# Patient Record
Sex: FEMALE | Race: WHITE | NOT HISPANIC OR LATINO | URBAN - METROPOLITAN AREA
[De-identification: names, ages, dates, MRNs, and addresses within clinical notes are randomized per-mention and may not be internally consistent; named-entity substitution may affect disease eponyms.]

---

## 2017-03-31 ENCOUNTER — OFFICE VISIT - HEALTHEAST (OUTPATIENT)
Dept: GERIATRICS | Facility: CLINIC | Age: 82
End: 2017-03-31

## 2017-03-31 DIAGNOSIS — J44.9 COPD (CHRONIC OBSTRUCTIVE PULMONARY DISEASE) (H): ICD-10-CM

## 2017-03-31 DIAGNOSIS — R41.89 COGNITIVE IMPAIRMENT: ICD-10-CM

## 2017-03-31 DIAGNOSIS — D64.9 ANEMIA: ICD-10-CM

## 2017-03-31 DIAGNOSIS — F32.A DEPRESSION: ICD-10-CM

## 2017-03-31 DIAGNOSIS — I10 ESSENTIAL HYPERTENSION: ICD-10-CM

## 2017-03-31 DIAGNOSIS — J18.9 PNEUMONIA OF BOTH LUNGS DUE TO INFECTIOUS ORGANISM, UNSPECIFIED PART OF LUNG: ICD-10-CM

## 2017-04-04 ENCOUNTER — OFFICE VISIT - HEALTHEAST (OUTPATIENT)
Dept: GERIATRICS | Facility: CLINIC | Age: 82
End: 2017-04-04

## 2017-04-04 DIAGNOSIS — R41.89 COGNITIVE IMPAIRMENT: ICD-10-CM

## 2017-04-04 DIAGNOSIS — D64.9 ANEMIA: ICD-10-CM

## 2017-04-04 DIAGNOSIS — J44.9 COPD (CHRONIC OBSTRUCTIVE PULMONARY DISEASE) (H): ICD-10-CM

## 2017-04-04 DIAGNOSIS — F32.A DEPRESSION: ICD-10-CM

## 2017-04-04 DIAGNOSIS — J18.9 PNEUMONIA OF BOTH LUNGS DUE TO INFECTIOUS ORGANISM, UNSPECIFIED PART OF LUNG: ICD-10-CM

## 2017-04-04 DIAGNOSIS — I25.10 CORONARY ARTERY DISEASE: ICD-10-CM

## 2017-04-04 DIAGNOSIS — I10 ESSENTIAL HYPERTENSION WITH GOAL BLOOD PRESSURE LESS THAN 130/85: ICD-10-CM

## 2017-04-07 ENCOUNTER — OFFICE VISIT - HEALTHEAST (OUTPATIENT)
Dept: GERIATRICS | Facility: CLINIC | Age: 82
End: 2017-04-07

## 2017-04-07 DIAGNOSIS — J44.9 COPD (CHRONIC OBSTRUCTIVE PULMONARY DISEASE) (H): ICD-10-CM

## 2017-04-07 DIAGNOSIS — F32.A DEPRESSION: ICD-10-CM

## 2017-04-07 DIAGNOSIS — D64.9 ANEMIA: ICD-10-CM

## 2017-04-07 DIAGNOSIS — J18.9 PNEUMONIA OF BOTH LUNGS DUE TO INFECTIOUS ORGANISM, UNSPECIFIED PART OF LUNG: ICD-10-CM

## 2017-04-07 DIAGNOSIS — I10 ESSENTIAL HYPERTENSION WITH GOAL BLOOD PRESSURE LESS THAN 130/85: ICD-10-CM

## 2017-04-07 DIAGNOSIS — R41.89 COGNITIVE IMPAIRMENT: ICD-10-CM

## 2017-04-10 ENCOUNTER — AMBULATORY - HEALTHEAST (OUTPATIENT)
Dept: GERIATRICS | Facility: CLINIC | Age: 82
End: 2017-04-10

## 2017-04-10 RX ORDER — ASCORBIC ACID 500 MG
500 TABLET ORAL 2 TIMES DAILY
Status: SHIPPED | COMMUNITY
Start: 2017-04-10

## 2017-04-10 RX ORDER — FERROUS SULFATE 325(65) MG
1 TABLET ORAL 2 TIMES DAILY
Status: SHIPPED | COMMUNITY
Start: 2017-04-10

## 2017-04-11 ENCOUNTER — OFFICE VISIT - HEALTHEAST (OUTPATIENT)
Dept: GERIATRICS | Facility: CLINIC | Age: 82
End: 2017-04-11

## 2017-04-11 DIAGNOSIS — J44.9 COPD (CHRONIC OBSTRUCTIVE PULMONARY DISEASE) (H): ICD-10-CM

## 2017-04-11 DIAGNOSIS — F32.A DEPRESSION: ICD-10-CM

## 2017-04-11 DIAGNOSIS — I10 ESSENTIAL HYPERTENSION WITH GOAL BLOOD PRESSURE LESS THAN 130/85: ICD-10-CM

## 2017-04-11 DIAGNOSIS — D64.9 ANEMIA: ICD-10-CM

## 2017-04-11 DIAGNOSIS — J18.9 PNEUMONIA OF BOTH LUNGS DUE TO INFECTIOUS ORGANISM, UNSPECIFIED PART OF LUNG: ICD-10-CM

## 2017-04-11 DIAGNOSIS — R41.89 COGNITIVE IMPAIRMENT: ICD-10-CM

## 2017-04-14 ENCOUNTER — OFFICE VISIT - HEALTHEAST (OUTPATIENT)
Dept: GERIATRICS | Facility: CLINIC | Age: 82
End: 2017-04-14

## 2017-04-14 DIAGNOSIS — F32.A DEPRESSION: ICD-10-CM

## 2017-04-14 DIAGNOSIS — I10 ESSENTIAL HYPERTENSION WITH GOAL BLOOD PRESSURE LESS THAN 130/85: ICD-10-CM

## 2017-04-14 DIAGNOSIS — A60.00 GENITAL HERPES: ICD-10-CM

## 2017-04-14 DIAGNOSIS — E78.5 HYPERLIPIDEMIA: ICD-10-CM

## 2017-04-14 DIAGNOSIS — R41.89 COGNITIVE IMPAIRMENT: ICD-10-CM

## 2017-04-14 DIAGNOSIS — D64.9 ANEMIA: ICD-10-CM

## 2017-04-18 ENCOUNTER — OFFICE VISIT - HEALTHEAST (OUTPATIENT)
Dept: GERIATRICS | Facility: CLINIC | Age: 82
End: 2017-04-18

## 2017-04-18 DIAGNOSIS — R41.89 COGNITIVE IMPAIRMENT: ICD-10-CM

## 2017-04-18 DIAGNOSIS — J44.9 COPD (CHRONIC OBSTRUCTIVE PULMONARY DISEASE) (H): ICD-10-CM

## 2017-04-18 DIAGNOSIS — J18.9 PNEUMONIA OF BOTH LUNGS DUE TO INFECTIOUS ORGANISM, UNSPECIFIED PART OF LUNG: ICD-10-CM

## 2017-04-18 DIAGNOSIS — F32.A DEPRESSION: ICD-10-CM

## 2017-04-18 DIAGNOSIS — I10 ESSENTIAL HYPERTENSION WITH GOAL BLOOD PRESSURE LESS THAN 130/85: ICD-10-CM

## 2017-04-18 DIAGNOSIS — D64.9 ANEMIA: ICD-10-CM

## 2017-04-21 ENCOUNTER — AMBULATORY - HEALTHEAST (OUTPATIENT)
Dept: GERIATRICS | Facility: CLINIC | Age: 82
End: 2017-04-21

## 2018-01-17 ENCOUNTER — RECORDS - HEALTHEAST (OUTPATIENT)
Dept: LAB | Facility: CLINIC | Age: 83
End: 2018-01-17

## 2018-01-17 LAB
ERYTHROCYTE [DISTWIDTH] IN BLOOD BY AUTOMATED COUNT: 14.9 % (ref 11–14.5)
HCT VFR BLD AUTO: 42.1 % (ref 35–47)
HGB BLD-MCNC: 12.7 G/DL (ref 12–16)
MCH RBC QN AUTO: 29.5 PG (ref 27–34)
MCHC RBC AUTO-ENTMCNC: 30.2 G/DL (ref 32–36)
MCV RBC AUTO: 98 FL (ref 80–100)
PLATELET # BLD AUTO: 260 THOU/UL (ref 140–440)
PMV BLD AUTO: 10.3 FL (ref 8.5–12.5)
RBC # BLD AUTO: 4.31 MILL/UL (ref 3.8–5.4)
WBC: 7.5 THOU/UL (ref 4–11)

## 2018-01-18 LAB
25(OH)D3 SERPL-MCNC: 34 NG/ML (ref 30–80)
ANION GAP SERPL CALCULATED.3IONS-SCNC: 7 MMOL/L (ref 5–18)
BUN SERPL-MCNC: 29 MG/DL (ref 8–28)
CALCIUM SERPL-MCNC: 10.3 MG/DL (ref 8.5–10.5)
CHLORIDE BLD-SCNC: 107 MMOL/L (ref 98–107)
CO2 SERPL-SCNC: 27 MMOL/L (ref 22–31)
CREAT SERPL-MCNC: 0.94 MG/DL (ref 0.6–1.1)
GFR SERPL CREATININE-BSD FRML MDRD: 57 ML/MIN/1.73M2
GLUCOSE BLD-MCNC: 79 MG/DL (ref 70–125)
POTASSIUM BLD-SCNC: 4.3 MMOL/L (ref 3.5–5)
SODIUM SERPL-SCNC: 141 MMOL/L (ref 136–145)

## 2018-07-19 ENCOUNTER — RECORDS - HEALTHEAST (OUTPATIENT)
Dept: LAB | Facility: CLINIC | Age: 83
End: 2018-07-19

## 2018-07-19 LAB
ALBUMIN SERPL-MCNC: 3.2 G/DL (ref 3.5–5)
ALP SERPL-CCNC: 88 U/L (ref 45–120)
ALT SERPL W P-5'-P-CCNC: 16 U/L (ref 0–45)
ANION GAP SERPL CALCULATED.3IONS-SCNC: 6 MMOL/L (ref 5–18)
AST SERPL W P-5'-P-CCNC: 16 U/L (ref 0–40)
BILIRUB SERPL-MCNC: 0.3 MG/DL (ref 0–1)
BUN SERPL-MCNC: 25 MG/DL (ref 8–28)
CALCIUM SERPL-MCNC: 10 MG/DL (ref 8.5–10.5)
CHLORIDE BLD-SCNC: 110 MMOL/L (ref 98–107)
CO2 SERPL-SCNC: 25 MMOL/L (ref 22–31)
CREAT SERPL-MCNC: 1.01 MG/DL (ref 0.6–1.1)
ERYTHROCYTE [DISTWIDTH] IN BLOOD BY AUTOMATED COUNT: 13.8 % (ref 11–14.5)
GFR SERPL CREATININE-BSD FRML MDRD: 52 ML/MIN/1.73M2
GLUCOSE BLD-MCNC: 133 MG/DL (ref 70–125)
HCT VFR BLD AUTO: 40.9 % (ref 35–47)
HGB BLD-MCNC: 12.7 G/DL (ref 12–16)
MCH RBC QN AUTO: 30.3 PG (ref 27–34)
MCHC RBC AUTO-ENTMCNC: 31.1 G/DL (ref 32–36)
MCV RBC AUTO: 98 FL (ref 80–100)
PLATELET # BLD AUTO: 194 THOU/UL (ref 140–440)
PMV BLD AUTO: 10.1 FL (ref 8.5–12.5)
POTASSIUM BLD-SCNC: 4.5 MMOL/L (ref 3.5–5)
PROT SERPL-MCNC: 5.7 G/DL (ref 6–8)
RBC # BLD AUTO: 4.19 MILL/UL (ref 3.8–5.4)
SODIUM SERPL-SCNC: 141 MMOL/L (ref 136–145)
VIT B12 SERPL-MCNC: 441 PG/ML (ref 213–816)
WBC: 5.5 THOU/UL (ref 4–11)

## 2019-02-14 ENCOUNTER — RECORDS - HEALTHEAST (OUTPATIENT)
Dept: LAB | Facility: CLINIC | Age: 84
End: 2019-02-14

## 2019-02-14 LAB
ANION GAP SERPL CALCULATED.3IONS-SCNC: 10 MMOL/L (ref 5–18)
BUN SERPL-MCNC: 28 MG/DL (ref 8–28)
CALCIUM SERPL-MCNC: 10.7 MG/DL (ref 8.5–10.5)
CHLORIDE BLD-SCNC: 108 MMOL/L (ref 98–107)
CO2 SERPL-SCNC: 22 MMOL/L (ref 22–31)
CREAT SERPL-MCNC: 0.97 MG/DL (ref 0.6–1.1)
ERYTHROCYTE [DISTWIDTH] IN BLOOD BY AUTOMATED COUNT: 13.8 % (ref 11–14.5)
GFR SERPL CREATININE-BSD FRML MDRD: 54 ML/MIN/1.73M2
GLUCOSE BLD-MCNC: 93 MG/DL (ref 70–125)
HCT VFR BLD AUTO: 46.4 % (ref 35–47)
HGB BLD-MCNC: 14.1 G/DL (ref 12–16)
MCH RBC QN AUTO: 30.5 PG (ref 27–34)
MCHC RBC AUTO-ENTMCNC: 30.4 G/DL (ref 32–36)
MCV RBC AUTO: 100 FL (ref 80–100)
PLATELET # BLD AUTO: 248 THOU/UL (ref 140–440)
PMV BLD AUTO: 10 FL (ref 8.5–12.5)
POTASSIUM BLD-SCNC: 4.5 MMOL/L (ref 3.5–5)
RBC # BLD AUTO: 4.63 MILL/UL (ref 3.8–5.4)
SODIUM SERPL-SCNC: 140 MMOL/L (ref 136–145)
WBC: 7.6 THOU/UL (ref 4–11)

## 2019-08-07 ENCOUNTER — RECORDS - HEALTHEAST (OUTPATIENT)
Dept: LAB | Facility: CLINIC | Age: 84
End: 2019-08-07

## 2019-08-07 LAB
ALBUMIN SERPL-MCNC: 3.7 G/DL (ref 3.5–5)
ALP SERPL-CCNC: 68 U/L (ref 45–120)
ALT SERPL W P-5'-P-CCNC: 10 U/L (ref 0–45)
ANION GAP SERPL CALCULATED.3IONS-SCNC: 5 MMOL/L (ref 5–18)
AST SERPL W P-5'-P-CCNC: 15 U/L (ref 0–40)
BILIRUB SERPL-MCNC: 0.3 MG/DL (ref 0–1)
BUN SERPL-MCNC: 25 MG/DL (ref 8–28)
CALCIUM SERPL-MCNC: 10.3 MG/DL (ref 8.5–10.5)
CHLORIDE BLD-SCNC: 110 MMOL/L (ref 98–107)
CO2 SERPL-SCNC: 26 MMOL/L (ref 22–31)
CREAT SERPL-MCNC: 0.92 MG/DL (ref 0.6–1.1)
ERYTHROCYTE [DISTWIDTH] IN BLOOD BY AUTOMATED COUNT: 13.7 % (ref 11–14.5)
GFR SERPL CREATININE-BSD FRML MDRD: 58 ML/MIN/1.73M2
GLUCOSE BLD-MCNC: 95 MG/DL (ref 70–125)
HBA1C MFR BLD: 5.4 % (ref 4.2–6.1)
HCT VFR BLD AUTO: 42.2 % (ref 35–47)
HGB BLD-MCNC: 13 G/DL (ref 12–16)
MCH RBC QN AUTO: 30.5 PG (ref 27–34)
MCHC RBC AUTO-ENTMCNC: 30.8 G/DL (ref 32–36)
MCV RBC AUTO: 99 FL (ref 80–100)
PLATELET # BLD AUTO: 191 THOU/UL (ref 140–440)
PMV BLD AUTO: 10.1 FL (ref 8.5–12.5)
POTASSIUM BLD-SCNC: 4.6 MMOL/L (ref 3.5–5)
PROT SERPL-MCNC: 6.3 G/DL (ref 6–8)
RBC # BLD AUTO: 4.26 MILL/UL (ref 3.8–5.4)
SODIUM SERPL-SCNC: 141 MMOL/L (ref 136–145)
TSH SERPL DL<=0.005 MIU/L-ACNC: 1.86 UIU/ML (ref 0.3–5)
VIT B12 SERPL-MCNC: 366 PG/ML (ref 213–816)
WBC: 8.2 THOU/UL (ref 4–11)

## 2019-10-28 ENCOUNTER — RECORDS - HEALTHEAST (OUTPATIENT)
Dept: LAB | Facility: CLINIC | Age: 84
End: 2019-10-28

## 2019-10-28 LAB
ALBUMIN UR-MCNC: ABNORMAL MG/DL
APPEARANCE UR: ABNORMAL
BACTERIA #/AREA URNS HPF: ABNORMAL HPF
BILIRUB UR QL STRIP: NEGATIVE
COLOR UR AUTO: YELLOW
GLUCOSE UR STRIP-MCNC: NEGATIVE MG/DL
HGB UR QL STRIP: NEGATIVE
KETONES UR STRIP-MCNC: NEGATIVE MG/DL
LEUKOCYTE ESTERASE UR QL STRIP: ABNORMAL
MUCOUS THREADS #/AREA URNS LPF: ABNORMAL LPF
NITRATE UR QL: NEGATIVE
PH UR STRIP: 5.5 [PH] (ref 4.5–8)
RBC #/AREA URNS AUTO: ABNORMAL HPF
SP GR UR STRIP: 1.02 (ref 1–1.03)
SQUAMOUS #/AREA URNS AUTO: ABNORMAL LPF
UROBILINOGEN UR STRIP-ACNC: ABNORMAL
WBC #/AREA URNS AUTO: >100 HPF
WBC CLUMPS #/AREA URNS HPF: PRESENT /[HPF]

## 2019-10-30 LAB — BACTERIA SPEC CULT: ABNORMAL

## 2019-11-27 ENCOUNTER — RECORDS - HEALTHEAST (OUTPATIENT)
Dept: LAB | Facility: CLINIC | Age: 84
End: 2019-11-27

## 2019-11-27 LAB
ALBUMIN SERPL-MCNC: 2.6 G/DL (ref 3.5–5)
ALP SERPL-CCNC: 203 U/L (ref 45–120)
ALT SERPL W P-5'-P-CCNC: 12 U/L (ref 0–45)
ANION GAP SERPL CALCULATED.3IONS-SCNC: 5 MMOL/L (ref 5–18)
AST SERPL W P-5'-P-CCNC: 13 U/L (ref 0–40)
BILIRUB SERPL-MCNC: 0.3 MG/DL (ref 0–1)
BUN SERPL-MCNC: 24 MG/DL (ref 8–28)
CALCIUM SERPL-MCNC: 9.7 MG/DL (ref 8.5–10.5)
CHLORIDE BLD-SCNC: 108 MMOL/L (ref 98–107)
CO2 SERPL-SCNC: 27 MMOL/L (ref 22–31)
CREAT SERPL-MCNC: 1.1 MG/DL (ref 0.6–1.1)
ERYTHROCYTE [DISTWIDTH] IN BLOOD BY AUTOMATED COUNT: 13.4 % (ref 11–14.5)
GFR SERPL CREATININE-BSD FRML MDRD: 47 ML/MIN/1.73M2
GLUCOSE BLD-MCNC: 98 MG/DL (ref 70–125)
HCT VFR BLD AUTO: 31.3 % (ref 35–47)
HGB BLD-MCNC: 9.1 G/DL (ref 12–16)
MCH RBC QN AUTO: 29.4 PG (ref 27–34)
MCHC RBC AUTO-ENTMCNC: 29.1 G/DL (ref 32–36)
MCV RBC AUTO: 101 FL (ref 80–100)
PLATELET # BLD AUTO: 382 THOU/UL (ref 140–440)
PMV BLD AUTO: 9.4 FL (ref 8.5–12.5)
POTASSIUM BLD-SCNC: 4.5 MMOL/L (ref 3.5–5)
PROT SERPL-MCNC: 5.9 G/DL (ref 6–8)
RBC # BLD AUTO: 3.09 MILL/UL (ref 3.8–5.4)
SODIUM SERPL-SCNC: 140 MMOL/L (ref 136–145)
WBC: 10.1 THOU/UL (ref 4–11)

## 2020-01-08 ENCOUNTER — RECORDS - HEALTHEAST (OUTPATIENT)
Dept: LAB | Facility: CLINIC | Age: 85
End: 2020-01-08

## 2020-01-08 LAB
ALBUMIN SERPL-MCNC: 3.1 G/DL (ref 3.5–5)
ALP SERPL-CCNC: 100 U/L (ref 45–120)
ALT SERPL W P-5'-P-CCNC: 14 U/L (ref 0–45)
ANION GAP SERPL CALCULATED.3IONS-SCNC: 6 MMOL/L (ref 5–18)
AST SERPL W P-5'-P-CCNC: 22 U/L (ref 0–40)
BILIRUB SERPL-MCNC: 0.3 MG/DL (ref 0–1)
BUN SERPL-MCNC: 12 MG/DL (ref 8–28)
CALCIUM SERPL-MCNC: 10.3 MG/DL (ref 8.5–10.5)
CHLORIDE BLD-SCNC: 106 MMOL/L (ref 98–107)
CO2 SERPL-SCNC: 29 MMOL/L (ref 22–31)
CREAT SERPL-MCNC: 0.83 MG/DL (ref 0.6–1.1)
ERYTHROCYTE [DISTWIDTH] IN BLOOD BY AUTOMATED COUNT: 13.7 % (ref 11–14.5)
GFR SERPL CREATININE-BSD FRML MDRD: >60 ML/MIN/1.73M2
GLUCOSE BLD-MCNC: 84 MG/DL (ref 70–125)
HCT VFR BLD AUTO: 40.7 % (ref 35–47)
HGB BLD-MCNC: 11.8 G/DL (ref 12–16)
MCH RBC QN AUTO: 27.4 PG (ref 27–34)
MCHC RBC AUTO-ENTMCNC: 29 G/DL (ref 32–36)
MCV RBC AUTO: 95 FL (ref 80–100)
PLATELET # BLD AUTO: 196 THOU/UL (ref 140–440)
PMV BLD AUTO: 10.5 FL (ref 8.5–12.5)
POTASSIUM BLD-SCNC: 4.3 MMOL/L (ref 3.5–5)
PROT SERPL-MCNC: 6.1 G/DL (ref 6–8)
RBC # BLD AUTO: 4.3 MILL/UL (ref 3.8–5.4)
SODIUM SERPL-SCNC: 141 MMOL/L (ref 136–145)
WBC: 4.9 THOU/UL (ref 4–11)

## 2020-06-24 ENCOUNTER — RECORDS - HEALTHEAST (OUTPATIENT)
Dept: LAB | Facility: CLINIC | Age: 85
End: 2020-06-24

## 2020-06-24 LAB
ALBUMIN SERPL-MCNC: 3.3 G/DL (ref 3.5–5)
ALP SERPL-CCNC: 124 U/L (ref 45–120)
ALT SERPL W P-5'-P-CCNC: 11 U/L (ref 0–45)
ANION GAP SERPL CALCULATED.3IONS-SCNC: 8 MMOL/L (ref 5–18)
AST SERPL W P-5'-P-CCNC: 15 U/L (ref 0–40)
BILIRUB SERPL-MCNC: 0.3 MG/DL (ref 0–1)
BUN SERPL-MCNC: 24 MG/DL (ref 8–28)
CALCIUM SERPL-MCNC: 9.6 MG/DL (ref 8.5–10.5)
CHLORIDE BLD-SCNC: 107 MMOL/L (ref 98–107)
CO2 SERPL-SCNC: 26 MMOL/L (ref 22–31)
CREAT SERPL-MCNC: 1.09 MG/DL (ref 0.6–1.1)
ERYTHROCYTE [DISTWIDTH] IN BLOOD BY AUTOMATED COUNT: 15.9 % (ref 11–14.5)
GFR SERPL CREATININE-BSD FRML MDRD: 47 ML/MIN/1.73M2
GLUCOSE BLD-MCNC: 88 MG/DL (ref 70–125)
HCT VFR BLD AUTO: 43.2 % (ref 35–47)
HGB BLD-MCNC: 12.9 G/DL (ref 12–16)
MCH RBC QN AUTO: 26.4 PG (ref 27–34)
MCHC RBC AUTO-ENTMCNC: 29.9 G/DL (ref 32–36)
MCV RBC AUTO: 88 FL (ref 80–100)
PLATELET # BLD AUTO: 343 THOU/UL (ref 140–440)
PMV BLD AUTO: 10.1 FL (ref 8.5–12.5)
POTASSIUM BLD-SCNC: 4.6 MMOL/L (ref 3.5–5)
PROT SERPL-MCNC: 6.7 G/DL (ref 6–8)
RBC # BLD AUTO: 4.89 MILL/UL (ref 3.8–5.4)
SODIUM SERPL-SCNC: 141 MMOL/L (ref 136–145)
TSH SERPL DL<=0.005 MIU/L-ACNC: 3.26 UIU/ML (ref 0.3–5)
VIT B12 SERPL-MCNC: 292 PG/ML (ref 213–816)
WBC: 8.9 THOU/UL (ref 4–11)

## 2020-06-26 LAB — 25(OH)D3 SERPL-MCNC: 23 NG/ML (ref 30–80)

## 2020-09-17 ENCOUNTER — RECORDS - HEALTHEAST (OUTPATIENT)
Dept: LAB | Facility: CLINIC | Age: 85
End: 2020-09-17

## 2020-09-18 LAB — VALPROATE SERPL-MCNC: 39.2 UG/ML (ref 50–150)

## 2021-05-24 ENCOUNTER — RECORDS - HEALTHEAST (OUTPATIENT)
Dept: ADMINISTRATIVE | Facility: CLINIC | Age: 86
End: 2021-05-24

## 2021-05-25 ENCOUNTER — RECORDS - HEALTHEAST (OUTPATIENT)
Dept: ADMINISTRATIVE | Facility: CLINIC | Age: 86
End: 2021-05-25

## 2021-05-28 ENCOUNTER — RECORDS - HEALTHEAST (OUTPATIENT)
Dept: ADMINISTRATIVE | Facility: CLINIC | Age: 86
End: 2021-05-28

## 2021-05-30 ENCOUNTER — RECORDS - HEALTHEAST (OUTPATIENT)
Dept: ADMINISTRATIVE | Facility: CLINIC | Age: 86
End: 2021-05-30

## 2021-05-30 VITALS — BODY MASS INDEX: 24.92 KG/M2 | WEIGHT: 145.2 LBS

## 2021-05-30 VITALS — WEIGHT: 145.2 LBS | BODY MASS INDEX: 24.92 KG/M2

## 2021-05-31 ENCOUNTER — RECORDS - HEALTHEAST (OUTPATIENT)
Dept: ADMINISTRATIVE | Facility: CLINIC | Age: 86
End: 2021-05-31

## 2021-06-01 ENCOUNTER — RECORDS - HEALTHEAST (OUTPATIENT)
Dept: ADMINISTRATIVE | Facility: CLINIC | Age: 86
End: 2021-06-01

## 2021-06-02 ENCOUNTER — RECORDS - HEALTHEAST (OUTPATIENT)
Dept: ADMINISTRATIVE | Facility: CLINIC | Age: 86
End: 2021-06-02

## 2021-06-09 NOTE — PROGRESS NOTES
[facility]        Visit Type: Review Of Multiple Medical Conditions (Cognitive impairment, hypertension, anemia)    Code Status:  DNR  Facility:  [facility] [[id number]]          PCP: No primary care provider on file.       PHONE: None     FAX:None        ASSESSMENT/PLAN:  1. Essential hypertension with goal blood pressure less than 130/85     2. Pneumonia of both lungs due to infectious organism, unspecified part of lung     3. Anemia     4. Cognitive impairment     5. COPD (chronic obstructive pulmonary disease)     6. Depression     7. Coronary artery disease       1. Pneumonia of both lungs due to infectious organism, unspecified part of lung   no evidence of recurrence.  Fever has now resolved, completed treatment with levofloxacin.     2. Essential hypertension   blood pressure is stable.  Norvasc has been discontinued in the hospital due to low blood pressures.  Also had electrolyte imbalance in the hospital which has resolved.     3. Cognitive impairment   has global amnesia per neurology.     4. Depression   stable, will restart Celexa 20 mg daily, review of other medications that she was taking at home also done today.  We will observe for now.  I do not think that she will need her other medications from home including gabapentin    5. Anemia   hemoglobin is low at 8.5 with increase RDW at 19.0.  Will check CBC with differential count, check Hemoccult ×3, check iron studies.  No evidence of bleeding at this time.  She is on ranitidine, we will not restart Prilosec at this time since the patient has a history of osteoporosis which could be exacerbated by Prilosec use    6. COPD (chronic obstructive pulmonary disease)   continue inhaler, Advair          HISTORY OF PRESENT ILLNESS:   Natividad Cheung is a 85 y.o. female with a history of CAD, COPD with exacerbation, hypertension, osteoporosis, depression, IC hemorrhage who was admitted for bilateral  pneumonia and hypoxemia.  She was seen by pulmonary and continued on IV levofloxacin which was later changed to oral levofloxacin.  She was initially treated with BiPAP but was discontinued after hypoxia has resolved.  Her chest CT scan showed resolving pneumonia.  She also was noted to have generalized weakness and deconditioning and so she is being sent to the TCU for further rehab.  She was also noted to have a long history of dementia but had increased cognitive impairment which initially was thought to be secondary to ICU delirium but despite resolving pneumonia, her cognitive impairment did not improve much.  Review of her notes showed that she was diagnosed with global amnesia by her primary neurologist.    Currently, she states that she is feeling well and that she does not have anymore any cough or shortness of breath.  Today, her fever has resolved and her UA is negative.  I was asked by her family to review all her home medications to see if she still needs them.  I did resume her Celexa and Valtrex but will continue to hold gabapentin and Prilosec unless there are further indications for this.  She denies any dysuria, hematuria, suprapubic pain, abdominal pain, nausea or vomiting.  Her appetite is improving and she states that she does not sleep as well.  She notes that she wakes up in the middle of the night but this is normal for her even at home.  She does not have any dizziness or lightheadedness.    Other review of systems are negative.      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     Osteoporosis      RBBB      Past Surgical History:   Procedure Laterality Date     CATARACT EXTRACTION Bilateral       LUMBAR LAMINECTOMY       REDUCTION MAMMAPLASTY       SHOULDER SURGERY       TOTAL HIP ARTHROPLASTY         SOCIAL HISTORY:  Social History     Social History     Marital status:      Spouse name: N/A     Number of children: 1     Years of education: N/A     Occupational History     Nurse      Social History Main Topics     Smoking status: Former Smoker     Smokeless tobacco: Not on file     Alcohol use 8.4 oz/week     14 Standard drinks or equivalent per week      Comment: per nephew, no history of alcoholism     Drug use: No     Sexual activity: No     Other Topics Concern     Not on file     Social History Narrative       FAMILY HISTORY:  Family History   Problem Relation Age of Onset     Dementia Mother      Diabetes type II Mother      Coronary artery disease Father      Macular degeneration Brother        MEDICATIONS:  Current Outpatient Prescriptions on File Prior to Visit   Medication Sig     acetaminophen (TYLENOL) 500 MG tablet Take 1,000 mg by mouth 2 (two) times a day as needed for pain or fever.     aspirin 81 MG EC tablet Take 81 mg by mouth daily.     calcium, as carbonate, (TUMS) 200 mg calcium (500 mg) chewable tablet Chew 1 tablet 2 (two) times a day.     docusate sodium (COLACE) 100 MG capsule Take 100 mg by mouth 2 (two) times a day.     ferrous sulfate 325 (65 FE) MG tablet Take 1 tablet (325 mg total) by mouth daily with breakfast.     fluticasone-salmeterol (ADVAIR) 100-50 mcg/dose DISKUS Inhale 1 puff 2 (two) times a day.     melatonin 1 mg Tab tablet Take 5 mg by mouth bedtime.     ranitidine (ZANTAC) 150 MG tablet Take 150 mg by mouth bedtime.     simvastatin (ZOCOR) 10 MG tablet Take 10 mg by mouth bedtime.     No current facility-administered medications on file prior to visit.        ALLERGIES:  No Known Allergies      PHYSICAL EXAMINATION:  Vital signs 116/66, 96%, 93, 17, 97.6  General: Awake, Alert, oriented x3, not in any form of acute distress, answers questions appropriately,  follows simple commands, frail and pale  HEENT: Pink conjunctiva, anicteric sclerae, oral mucosa is moist  NECK: Supple, without any lymphadenopathy, thyromegaly or any masses  LUNG:  good chest expansion. There are no crackles but with generalized rhonchi, no wheezes, normal AP diameter  BACK: No kyphosis of the thoracic spine  CVS: There is good S1  S2, there are no murmurs, no heaves, rhythm is regular  ABDOMEN: Globular and soft, nontender to palpation, no organomegaly, good bowel sounds  EXTREMITIES: Good range of motion on both upper and lower extremities, no pedal edema, no cyanosis or clubbing, no calf tenderness  SKIN: Warm and dry, no rashes or erythema noted    LABS:  Lab Results   Component Value Date    WBC 5.6 04/01/2017    HGB 8.5 (L) 04/04/2017    HCT 30.5 (L) 04/01/2017    MCV 82 04/01/2017     04/01/2017     Lab Results   Component Value Date    CREATININE 0.87 03/20/2017    BUN 24 03/20/2017     (L) 03/20/2017    K 3.9 03/20/2017     03/20/2017    CO2 25 03/20/2017           35 minutes of total time spent, greater than 55% of the time spent in coordination of care and counseling regarding the above medical issues and plan of care. I have reviewed the patient's medical records, labs and medications.       Electronically signed by:Kristine Mcmullen MD

## 2021-06-09 NOTE — PROGRESS NOTES
[facility]        Visit Type: H & P (Pneumonia, hypertension, anemia)    Code Status:  DNR  Facility:  [facility] [[id number]]          PCP: No primary care provider on file.       PHONE: None     FAX:None        ASSESSMENT/PLAN:  1. Pneumonia of both lungs due to infectious organism, unspecified part of lung   no evidence of recurrence.  But since with mild fever, will look for other source and check a UA UC.  If negative, will recheck chest x-ray.  Check hemogram    2. Essential hypertension   blood pressure is stable.  Norvasc has been discontinued in the hospital due to low blood pressures.  Also had electrolyte imbalance in the hospital which has resolved.     3. Cognitive impairment   has global amnesia per neurology.     4. Depression   stable, currently not on any medications    5. Anemia   check hemogram, continue ferrous sulfate    6. COPD (chronic obstructive pulmonary disease)   continue inhaler, Advair          HISTORY OF PRESENT ILLNESS:   Natividad Cheung is a 85 y.o. female with a history of CAD, COPD with exacerbation, hypertension, osteoporosis, depression, IC hemorrhage who was admitted for bilateral pneumonia and hypoxemia.  She was seen by pulmonary and continued on IV levofloxacin which was later changed to oral levofloxacin.  She was initially treated with BiPAP but was discontinued after hypoxia has resolved.  Her chest CT scan showed resolving pneumonia.  She also was noted to have generalized weakness and deconditioning and so she is being sent to the TCU for further rehab.  She was also noted to have a long history of dementia but had increased cognitive impairment which initially was thought to be secondary to ICU delirium but despite resolving pneumonia, her cognitive impairment did not improve much.  Review of her notes showed that she was diagnosed with global amnesia by her primary neurologist.    Currently, she states that  she is feeling well and that she does not have anymore any cough or shortness of breath.  Today she was noted to have a fever of 99.7.  She denies any dysuria, hematuria, suprapubic pain, abdominal pain, nausea or vomiting.  Her appetite is improving and she states that she does not sleep as well.  She does not have any dizziness or lightheadedness.    Other review of systems are negative.      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     Osteoporosis      RBBB      Past Surgical History:   Procedure Laterality Date     CATARACT EXTRACTION Bilateral      LUMBAR LAMINECTOMY       REDUCTION MAMMAPLASTY       SHOULDER SURGERY       TOTAL HIP ARTHROPLASTY         SOCIAL HISTORY:  Social History     Social History     Marital status:      Spouse name: N/A     Number of children: 1     Years of education: N/A     Occupational History     Nurse      Social History Main Topics     Smoking status: Former Smoker     Smokeless tobacco: Not on file     Alcohol use 8.4 oz/week     14 Standard drinks or equivalent per week      Comment: per nephew, no history of alcoholism     Drug use: No     Sexual activity: No     Other Topics Concern     Not on file     Social History Narrative       FAMILY HISTORY:  Family History   Problem Relation Age of Onset     Dementia Mother      Diabetes type II Mother      Coronary artery disease Father      Macular degeneration Brother        MEDICATIONS:  Current Outpatient Prescriptions on File Prior to Visit   Medication Sig     acetaminophen (TYLENOL) 500 MG tablet Take 1,000 mg by mouth 2 (two) times a day as needed for pain or fever.     aspirin 81 MG EC tablet Take 81 mg by mouth daily.      calcium, as carbonate, (TUMS) 200 mg calcium (500 mg) chewable tablet Chew 1 tablet 2 (two) times a day.     docusate sodium (COLACE) 100 MG capsule Take 100 mg by mouth 2 (two) times a day.     ferrous sulfate 325 (65 FE) MG tablet Take 1 tablet (325 mg total) by mouth daily with breakfast.     fluticasone-salmeterol (ADVAIR) 100-50 mcg/dose DISKUS Inhale 1 puff 2 (two) times a day.     melatonin 1 mg Tab tablet Take 5 mg by mouth bedtime.     ranitidine (ZANTAC) 150 MG tablet Take 150 mg by mouth bedtime.     simvastatin (ZOCOR) 10 MG tablet Take 10 mg by mouth bedtime.     No current facility-administered medications on file prior to visit.        ALLERGIES:  No Known Allergies      PHYSICAL EXAMINATION:  Vital signs 113/61, 93%, 98, 20, 99.7  General: Awake, Alert, oriented x3, not in any form of acute distress, answers questions appropriately, follows simple commands, frail and pale  HEENT: Pink conjunctiva, anicteric sclerae, oral mucosa is moist  NECK: Supple, without any lymphadenopathy, thyromegaly or any masses  LUNG:  good chest expansion. There are no crackles but with generalized rhonchi, no wheezes, normal AP diameter  BACK: No kyphosis of the thoracic spine  CVS: There is good S1  S2, there are no murmurs, no heaves, rhythm is regular  ABDOMEN: Globular and soft, nontender to palpation, no organomegaly, good bowel sounds  EXTREMITIES: Good range of motion on both upper and lower extremities, no pedal edema, no cyanosis or clubbing, no calf tenderness  SKIN: Warm and dry, no rashes or erythema noted    LABS:  Lab Results   Component Value Date    WBC 5.6 04/01/2017    HGB 8.7 (L) 04/01/2017    HCT 30.5 (L) 04/01/2017    MCV 82 04/01/2017     04/01/2017     Lab Results   Component Value Date    CREATININE 0.87 03/20/2017    BUN 24 03/20/2017     (L) 03/20/2017    K 3.9 03/20/2017     03/20/2017    CO2 25 03/20/2017           45 minutes of total time spent, greater than 55% of the  time spent in coordination of care and counseling regarding the above medical issues and plan of care. I have reviewed the patient's medical records, labs and medications.       Electronically signed by:Kristine Mcmullen MD

## 2021-06-09 NOTE — PROGRESS NOTES
[facility]    DATE:2017    NAME:  Natividad Cheung             :  1932  MRN: [id number]  CODE STATUS:  DNR    FACILITY:  [facility] [[id number]]       ROOM:   H202    CHIEF COMPLAINT/REASON FOR VISIT:  Chief Complaint   Patient presents with     Problem Visit     Acute respiratory failure with hypoxia     HISTORY OF PRESENT ILLNESS: Natividad Cheung is a 85 y.o. female with CAD, COPD with exacerbation, Hypertension, Osteoporosis, Depression, IC hemorrhage who was admitted for bilateral pneumonia and hypoxemia.  She was seen by pulmonary and continued on IV Levofloxacin which was later changed to oral Levofloxacin.  She was initially treated with BiPAP but was discontinued after hypoxia resolved.  Her chest CT scan showed resolving Pneumonia.   She has a long standing history of Dementia but had increased cognitive impairment which initially was thought to be secondary to ICU delirium but despite resolving Pneumonia, her cognitive impairment did not improve much.  She was diagnosed with global amnesia by her primary neurologist.She also was noted to have generalized weakness and deconditioning and so she is being sent to the TCU for further rehab.     Today, she states that she is feeling well and denies any respiratory symptomology. She has Anemia and her last Hgb was 8.5.  She is currently on Ferrous sulfate 325 mg daily.  She denies any fatigue and reports her weakness is improving.  She is Celexa for Depression.  Her mood is stable today.   She denies any pain and questions why staff is always asking her if she has pain.   She denies any dysuria, hematuria, suprapubic pain, abdominal pain, nausea or vomiting and her UA was negative.  Her appetite is good.  She does not have any dizziness or lightheadedness.    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     Osteoporosis      RBBB      Past Surgical History:   Procedure Laterality Date     CATARACT EXTRACTION Bilateral      LUMBAR LAMINECTOMY       REDUCTION MAMMAPLASTY       SHOULDER SURGERY       TOTAL HIP ARTHROPLASTY       Family History   Problem Relation Age of Onset     Dementia Mother      Diabetes type II Mother      Coronary artery disease Father      Macular degeneration Brother      Social History     Social History     Marital status:      Spouse name: N/A     Number of children: 1     Years of education: N/A     Occupational History     Nurse      Social History Main Topics     Smoking status: Former Smoker     Smokeless tobacco: Not on file     Alcohol use 8.4 oz/week     14 Standard drinks or equivalent per week      Comment: per nephew, no history of alcoholism     Drug use: No     Sexual activity: No     Other Topics Concern     Not on file     Social History Narrative     No Known Allergies     Current Outpatient Prescriptions   Medication Sig Dispense Refill     acetaminophen (TYLENOL) 500 MG tablet Take 1,000 mg by mouth 2 (two) times a day as needed for pain or fever.       ascorbic acid, vitamin C, (ASCORBIC ACID WITH LAXMI HIPS) 500 MG tablet Take 500 mg by mouth 2 (two) times a day.       aspirin 81 MG EC tablet Take 81 mg by mouth daily.       calcium, as carbonate, (TUMS) 200 mg calcium (500 mg) chewable tablet Chew 1 tablet 2 (two) times a day.       docusate sodium (COLACE) 100 MG capsule Take 100 mg by mouth 2 (two) times a day.       ferrous sulfate 325 (65 FE) MG tablet Take 1 tablet by mouth 2 (two) times a day. With food       fluticasone-salmeterol (ADVAIR) 100-50 mcg/dose DISKUS Inhale 1 puff 2 (two) times a day. 1 each 0     melatonin 1 mg Tab tablet Take 5 mg by mouth bedtime.       ranitidine (ZANTAC) 150 MG tablet Take  150 mg by mouth bedtime.       simvastatin (ZOCOR) 10 MG tablet Take 10 mg by mouth bedtime.       No current facility-administered medications for this visit.      REVIEW OF SYSTEMS    Currently, no fever, chills, or rigors. She does not have any visual or hearing problems. She denies any chest pain, headaches, palpitations, lightheadedness, dizziness, shortness of breath, or cough. Her appetite is good, he denies any GERD symptoms, she denies any difficulty with swallowing, nausea, or vomiting.  She denies any abdominal pain, diarrhea or constipation. She denies any urinary symptoms. No insomnia. No active bleeding. No rash.     PHYSICAL EXAMINATION:    Vitals:    04/11/17 2001   BP: 121/68   Pulse: 62   Resp: 16   Temp: 98.2  F (36.8  C)   SpO2: 96%   Weight: 145 lb 3.2 oz (65.9 kg)     General: Awake, Alert, oriented x3, not in any form of acute distress, answers questions appropriately, follows simple commands, conversant  HEENT: Pink conjunctiva, anicteric sclerae, oral mucosa is moist  NECK: Supple, without any lymphadenopathy, thyromegaly or any masses  LUNG: Clear to auscultation with good chest expansion. There are no crackles or wheezes, normal AP diameter  BACK: No kyphosis of the thoracic spine  CVS: There is good S1  S2, there are no murmurs or heaves, rhythm is regular  ABDOMEN: Globular and soft, nontender to palpation, no organomegaly, good bowel sounds  EXTREMITIES: Good range of motion on both upper and lower extremities, no pedal edema, no cyanosis or clubbing, no calf tenderness  SKIN: Warm and dry, no rashes or erythema noted    LABS:     Lab Results   Component Value Date    WBC 5.9 04/11/2017    HGB 9.6 (L) 04/11/2017    HCT 33.2 (L) 04/11/2017    MCV 83 04/11/2017     04/11/2017     Results for orders placed or performed during the hospital encounter of 03/09/17   Basic Metabolic Panel   Result Value Ref Range    Sodium 138 136 - 145 mmol/L    Potassium 3.7 3.5 - 5.0 mmol/L     Chloride 100 98 - 107 mmol/L    CO2 31 22 - 31 mmol/L    Anion Gap, Calculation 7 5 - 18 mmol/L    Glucose 98 70 - 125 mg/dL    Calcium 8.8 8.5 - 10.5 mg/dL    BUN 34 (H) 8 - 28 mg/dL    Creatinine 1.12 (H) 0.60 - 1.10 mg/dL    GFR MDRD Af Amer 56 (L) >60 mL/min/1.73m2    GFR MDRD Non Af Amer 46 (L) >60 mL/min/1.73m2     Vitamin D, Total (25-Hydroxy)   Date Value Ref Range Status   01/25/2017 31.5 30.0 - 80.0 ng/mL Final     Lab Results   Component Value Date    [id number] 722 01/25/2017     ASSESSMENT/PLAN:    1. Pneumonia of both lungs due to infectious organism, unspecified part of lung  No evidence of recurrence. Completed treatment with Levofloxacin.  Norespiratorysymptomology   2. Essential hypertension  Blood pressure are within target range. Norvasc has been discontinued in the hospital due to low blood pressures.  Also had electrolyte imbalance in the hospital which has resolved.     3. Cognitive impairment  Global amnesia per neurology.     4. Depression   stable, will restart Celexa 20 mg daily, review of other medications that she was taking at home also done today.  We will observe for now.  I do not think that she will need her other medications from home including gabapentin    5. Anemia  Hemoglobin is low at 8.5 with increase RDW at 19.0.  Will check CBC with differential count, check Hemoccult ×3, check iron studies.  No evidence of bleeding at this time.  She is on Ranitidine, we will not restart Prilosec at this time since the patient has a history of Osteoporosis which could be exacerbated by Prilosec use.  Will increase Ferrous sulfate to 325 BID and Vitamin C BID.   6. COPD (chronic obstructive pulmonary disease)  Stable, will continue inhaled steroids        Electronically signed by:  Magen Ashton CNP    35 minutes TT of which 50% was spent in counseling and coordination of care of the above plan.    Time spent in interview and examination of patient, review of available records,  and discussion with nursing staff. Continue care plan, efforts at therapy, and monitor nutritional status.

## 2021-06-10 NOTE — PROGRESS NOTES
[facility]        Visit Type: Discharge Summary (Recent pneumonia, cognitive impairment, hypertension)    Code Status:  DNR  Facility:  [facility] [[id number]]          PCP: No primary care provider on file.       PHONE: None     FAX:None        ASSESSMENT/PLAN:  1. Pneumonia of both lungs due to infectious organism, unspecified part of lung     2. Essential hypertension with goal blood pressure less than 130/85     3. Cognitive impairment     4. Anemia     5. COPD (chronic obstructive pulmonary disease)     6. Depression       1. Pneumonia of both lungs due to infectious organism, unspecified part of lung   no evidence of recurrence.  Fever has now resolved, completed treatment with levofloxacin.     2. Essential hypertension   blood pressure is high over the last 2 days but -120 previously now back to 127/6..  Norvasc has been discontinued in the hospital due to low blood pressures.  Also had electrolyte imbalance in the hospital which has resolved.  We will monitor blood pressure closely and consider restarting Norvasc if blood pressure persistently high    3. Cognitive impairment   has global amnesia per neurology.     4. Depression   stable, restarted Celexa 20 mg daily.   Review of other medications that she was taking at home also done.  We will observe for now.     5. Anemia   hemoglobin has improved to 10.1 from 8.5 with increase RDW at 19.0.  Normal differentials.  No evidence of bleeding at this time.  She is on ranitidine, we will not restart Prilosec at this time since the patient has a history of osteoporosis which could be exacerbated by Prilosec use.  Ferrous sulfate 325 mg twice daily was started    6. COPD (chronic obstructive pulmonary disease)   continue inhaler, Advair          HISTORY OF PRESENT ILLNESS:   Natividad Cheung is a 85 y.o. female with a history of CAD, COPD with exacerbation, hypertension, osteoporosis, depression,  IC hemorrhage who was admitted for bilateral pneumonia and hypoxemia.  She was seen by pulmonary and continued on IV levofloxacin which was later changed to oral levofloxacin.  She was initially treated with BiPAP but was discontinued after hypoxia has resolved.  Her chest CT scan showed resolving pneumonia.  She also was noted to have generalized weakness and deconditioning and so she is being sent to the TCU for further rehab.  She was also noted to have a long history of dementia but had increased cognitive impairment which initially was thought to be secondary to ICU delirium but despite resolving pneumonia, her cognitive impairment did not improve much.  Review of her notes showed that she was diagnosed with global amnesia by her primary neurologist.    Currently, she states that she is feeling well and that she does not have anymore any cough or shortness of breath.  Her fever has resolved and her UA is negative.  I was asked by her family to review all her home medications to see if she still needs them.  I did resume her Celexa and Valtrex but will continue to hold gabapentin and Prilosec unless there are further indications for this.  Her vitamin C was also restarted.  She denies any dysuria, hematuria, suprapubic pain, abdominal pain, nausea or vomiting.  Her appetite is improving and she states that she is sleeping  well although she notes that she wakes up in the middle of the night but this is normal for her even at home.  She does not have any dizziness or lightheadedness.  She is anemic but her hemogram is improved with a hemoglobin of 10.1 today from 8.5 on 4/4/17.  No bleeding episodes.    Other review of systems are negative.      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     Osteoporosis      RBBB      Past Surgical History:   Procedure Laterality Date     CATARACT EXTRACTION Bilateral      LUMBAR LAMINECTOMY       REDUCTION MAMMAPLASTY       SHOULDER SURGERY       TOTAL HIP ARTHROPLASTY         SOCIAL HISTORY:  Social History     Social History     Marital status:      Spouse name: N/A     Number of children: 1     Years of education: N/A     Occupational History     Nurse      Social History Main Topics     Smoking status: Former Smoker     Smokeless tobacco: Not on file     Alcohol use 8.4 oz/week     14 Standard drinks or equivalent per week      Comment: per nephew, no history of alcoholism     Drug use: No     Sexual activity: No     Other Topics Concern     Not on file     Social History Narrative       FAMILY HISTORY:  Family History   Problem Relation Age of Onset     Dementia Mother      Diabetes type II Mother      Coronary artery disease Father      Macular degeneration Brother        MEDICATIONS:  Reviewed per TCU orders summary    ALLERGIES:  No Known Allergies      PHYSICAL EXAMINATION:  Vital signs 127/60, 98%, 98, 16, 97.9  General: Awake, Alert, oriented x3, not in any form of acute distress, answers questions appropriately, follows simple commands, frail and pale  HEENT: Pink conjunctiva, anicteric sclerae, oral mucosa is dry  NECK: Supple, without any lymphadenopathy, thyromegaly or any masses  LUNG:  good chest expansion. There are no crackles but with generalized rhonchi, no wheezes, normal AP diameter  BACK: No kyphosis of the thoracic spine  CVS: There is good S1  S2, there are no murmurs, no heaves, rhythm is regular  ABDOMEN: Globular and soft, nontender to palpation, no organomegaly, good bowel sounds  EXTREMITIES: Good range of motion on both upper and lower extremities, no pedal edema, no cyanosis or clubbing, no calf tenderness  SKIN: Warm and dry, no rashes or erythema  noted    LABS:  Lab Results   Component Value Date    WBC 5.6 04/18/2017    HGB 10.1 (L) 04/18/2017    HCT 35.4 04/18/2017    MCV 88 04/18/2017     04/18/2017     Lab Results   Component Value Date    CREATININE 0.87 03/20/2017    BUN 24 03/20/2017     (L) 03/20/2017    K 3.9 03/20/2017     03/20/2017    CO2 25 03/20/2017           >35 min TT spent at discharge with >50 % spent in coordination of care and counseling regarding the above medical issues and plan of care.  Multiple questions were answered, medications reviewed.    Electronically signed by:Kristine Mcmullen MD

## 2021-06-10 NOTE — PROGRESS NOTES
[facility]        Visit Type: Review Of Multiple Medical Conditions (Cognitive impairment, anemia, hypertension)    Code Status:  DNR  Facility:  [facility] [[id number]]          PCP: No primary care provider on file.       PHONE: None     FAX:None        ASSESSMENT/PLAN:  1. Essential hypertension with goal blood pressure less than 130/85     2. Anemia     3. Cognitive impairment     4. Pneumonia of both lungs due to infectious organism, unspecified part of lung     5. COPD (chronic obstructive pulmonary disease)     6. Depression       1. Pneumonia of both lungs due to infectious organism, unspecified part of lung   no evidence of recurrence.  Fever has now resolved, completed treatment with levofloxacin.     2. Essential hypertension   blood pressure is high over the last 2 days but -120 previously.  Norvasc has been discontinued in the hospital due to low blood pressures.  Also had electrolyte imbalance in the hospital which has resolved.  We will monitor blood pressure closely and consider restarting Norvasc if blood pressure persistently high    3. Cognitive impairment   has global amnesia per neurology.     4. Depression   stable, restarted Celexa 20 mg daily.   Review of other medications that she was taking at home also done.  We will observe for now.  I do not think that she will need her other medications from home including gabapentin    5. Anemia   hemoglobin has slightly improved at 9.6 from 8.5 with increase RDW at 19.0.  Normal differential count, check Hemoccult ×3, check iron studies.  No evidence of bleeding at this time.  She is on ranitidine, we will not restart Prilosec at this time since the patient has a history of osteoporosis which could be exacerbated by Prilosec use.  Ferrous sulfate 325 mg twice daily was started    6. COPD (chronic obstructive pulmonary disease)   continue inhaler, Advair          HISTORY OF PRESENT  ILLNESS:   Natividad Cheung is a 85 y.o. female with a history of CAD, COPD with exacerbation, hypertension, osteoporosis, depression, IC hemorrhage who was admitted for bilateral pneumonia and hypoxemia.  She was seen by pulmonary and continued on IV levofloxacin which was later changed to oral levofloxacin.  She was initially treated with BiPAP but was discontinued after hypoxia has resolved.  Her chest CT scan showed resolving pneumonia.  She also was noted to have generalized weakness and deconditioning and so she is being sent to the TCU for further rehab.  She was also noted to have a long history of dementia but had increased cognitive impairment which initially was thought to be secondary to ICU delirium but despite resolving pneumonia, her cognitive impairment did not improve much.  Review of her notes showed that she was diagnosed with global amnesia by her primary neurologist.    Currently, she states that she is feeling well and that she does not have anymore any cough or shortness of breath.  Her fever has resolved and her UA is negative.  I was asked by her family to review all her home medications to see if she still needs them.  I did resume her Celexa and Valtrex but will continue to hold gabapentin and Prilosec unless there are further indications for this.  Her vitamin C was also restarted.  She denies any dysuria, hematuria, suprapubic pain, abdominal pain, nausea or vomiting.  Her appetite is improving and she states that she is sleeping  well although she notes that she wakes up in the middle of the night but this is normal for her even at home.  She does not have any dizziness or lightheadedness.    Other review of systems are negative.      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     Osteoporosis      RBBB      Past Surgical History:   Procedure Laterality Date     CATARACT EXTRACTION Bilateral      LUMBAR LAMINECTOMY       REDUCTION MAMMAPLASTY       SHOULDER SURGERY       TOTAL HIP ARTHROPLASTY         SOCIAL HISTORY:  Social History     Social History     Marital status:      Spouse name: N/A     Number of children: 1     Years of education: N/A     Occupational History     Nurse      Social History Main Topics     Smoking status: Former Smoker     Smokeless tobacco: Not on file     Alcohol use 8.4 oz/week     14 Standard drinks or equivalent per week      Comment: per nephew, no history of alcoholism     Drug use: No     Sexual activity: No     Other Topics Concern     Not on file     Social History Narrative       FAMILY HISTORY:  Family History   Problem Relation Age of Onset     Dementia Mother      Diabetes type II Mother      Coronary artery disease Father      Macular degeneration Brother        MEDICATIONS:  Reviewed per TCU orders summary    ALLERGIES:  No Known Allergies      PHYSICAL EXAMINATION:  Vital signs 160/78, 93%, 86, 20, 97.6  General: Awake, Alert, oriented x3, not in any form of acute distress, answers questions appropriately, follows simple commands, frail and pale  HEENT: Pink conjunctiva, anicteric sclerae, oral mucosa is dry  NECK: Supple, without any lymphadenopathy, thyromegaly or any masses  LUNG:  good chest expansion. There are no crackles but with generalized rhonchi, no wheezes, normal AP diameter  BACK: No kyphosis of the thoracic spine  CVS: There is good S1  S2, there are no murmurs, no heaves, rhythm is regular  ABDOMEN: Globular and soft, nontender to palpation, no organomegaly, good bowel sounds  EXTREMITIES: Good range of motion on both upper and lower extremities, no pedal edema, no cyanosis or clubbing, no calf tenderness  SKIN: Warm and dry, no rashes or  erythema noted    LABS:  Lab Results   Component Value Date    WBC 5.9 04/11/2017    HGB 9.6 (L) 04/11/2017    HCT 33.2 (L) 04/11/2017    MCV 83 04/11/2017     04/11/2017     Lab Results   Component Value Date    CREATININE 0.87 03/20/2017    BUN 24 03/20/2017     (L) 03/20/2017    K 3.9 03/20/2017     03/20/2017    CO2 25 03/20/2017         Electronically signed by:Kristine Mcmullen MD

## 2021-06-10 NOTE — PROGRESS NOTES
[facility]    DATE:2017    NAME:  Natividad Cheung             :  1932  MRN: [id number]  CODE STATUS:  DNR    FACILITY:  [facility] [[id number]]       ROOM:   H202    CHIEF COMPLAINT/REASON FOR VISIT:  Chief Complaint   Patient presents with     Problem Visit     Anemia     HISTORY OF PRESENT ILLNESS: Natividad Cheung is a 85 y.o. female with CAD, COPD with exacerbation, Hypertension, Osteoporosis, Depression, IC hemorrhage who was admitted for bilateral pneumonia and hypoxemia.  She was seen by pulmonary and continued on IV Levofloxacin which was later changed to oral Levofloxacin.  She was initially treated with BiPAP but was discontinued after hypoxia resolved.  Her chest CT scan showed resolving Pneumonia.   She has a long standing history of Dementia but had increased cognitive impairment which initially was thought to be secondary to ICU delirium but despite resolving Pneumonia, her cognitive impairment did not improve much.  She was diagnosed with global amnesia by her primary neurologist.She also was noted to have generalized weakness and deconditioning and so she is being sent to the TCU for further rehab.     Today, patient was seen in her room resting at the bedside.  She hopes to be going home early next week.  She denies any pain or weakness.  Hgb has improved to 9.6 from 8.5  She continues on Ferrous sulfate 325 mg daily. Her appetite is good.  She does not have any dizziness or lightheadedness.    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     Osteoporosis      RBBB      Past Surgical History:   Procedure Laterality Date     CATARACT EXTRACTION Bilateral      LUMBAR LAMINECTOMY        REDUCTION MAMMAPLASTY       SHOULDER SURGERY       TOTAL HIP ARTHROPLASTY       Family History   Problem Relation Age of Onset     Dementia Mother      Diabetes type II Mother      Coronary artery disease Father      Macular degeneration Brother      Social History     Social History     Marital status:      Spouse name: N/A     Number of children: 1     Years of education: N/A     Occupational History     Nurse      Social History Main Topics     Smoking status: Former Smoker     Smokeless tobacco: Not on file     Alcohol use 8.4 oz/week     14 Standard drinks or equivalent per week      Comment: per nephew, no history of alcoholism     Drug use: No     Sexual activity: No     Other Topics Concern     Not on file     Social History Narrative     No Known Allergies     Current Outpatient Prescriptions   Medication Sig Dispense Refill     acetaminophen (TYLENOL) 500 MG tablet Take 1,000 mg by mouth 2 (two) times a day as needed for pain or fever.       ascorbic acid, vitamin C, (ASCORBIC ACID WITH LAXMI HIPS) 500 MG tablet Take 500 mg by mouth 2 (two) times a day.       aspirin 81 MG EC tablet Take 81 mg by mouth daily.       calcium, as carbonate, (TUMS) 200 mg calcium (500 mg) chewable tablet Chew 1 tablet 2 (two) times a day.       docusate sodium (COLACE) 100 MG capsule Take 100 mg by mouth 2 (two) times a day.       ferrous sulfate 325 (65 FE) MG tablet Take 1 tablet by mouth 2 (two) times a day. With food       fluticasone-salmeterol (ADVAIR) 100-50 mcg/dose DISKUS Inhale 1 puff 2 (two) times a day. 1 each 0     melatonin 1 mg Tab tablet Take 5 mg by mouth bedtime.       ranitidine (ZANTAC) 150 MG tablet Take 150 mg by mouth bedtime.       simvastatin (ZOCOR) 10 MG tablet Take 10 mg by mouth bedtime.       No current facility-administered medications for this visit.      REVIEW OF SYSTEMS    Currently, no fever, chills, or rigors. She does not have any visual or hearing problems. She denies any chest  pain, headaches, palpitations, lightheadedness, dizziness, shortness of breath, or cough. Her appetite is good, he denies any GERD symptoms, she denies any difficulty with swallowing, nausea, or vomiting.  She denies any abdominal pain, diarrhea or constipation. She denies any urinary symptoms. No insomnia. No active bleeding. No rash.     PHYSICAL EXAMINATION:    Vitals:    04/24/17 0150   BP: 148/76   Pulse: 96   Resp: 20   Temp: 99  F (37.2  C)   SpO2: 97%   Weight: 145 lb 3.2 oz (65.9 kg)     General: Awake, Alert, oriented x3, not in any form of acute distress, answers questions appropriately, follows simple commands, conversant  HEENT: Pink conjunctiva, anicteric sclerae, oral mucosa is moist  NECK: Supple, without any lymphadenopathy, thyromegaly or any masses  LUNG: Clear to auscultation with good chest expansion. There are no crackles or wheezes, normal AP diameter  BACK: No kyphosis of the thoracic spine  CVS: There is good S1  S2, there are no murmurs or heaves, rhythm is regular  ABDOMEN: Globular and soft, nontender to palpation, no organomegaly, good bowel sounds  EXTREMITIES: Good range of motion on both upper and lower extremities, no pedal edema, no cyanosis or clubbing, no calf tenderness  SKIN: Warm and dry, no rashes or erythema noted    LABS:     Lab Results   Component Value Date    WBC 5.6 04/18/2017    HGB 10.1 (L) 04/18/2017    HCT 35.4 04/18/2017    MCV 88 04/18/2017     04/18/2017     Results for orders placed or performed during the hospital encounter of 03/09/17   Basic Metabolic Panel   Result Value Ref Range    Sodium 138 136 - 145 mmol/L    Potassium 3.7 3.5 - 5.0 mmol/L    Chloride 100 98 - 107 mmol/L    CO2 31 22 - 31 mmol/L    Anion Gap, Calculation 7 5 - 18 mmol/L    Glucose 98 70 - 125 mg/dL    Calcium 8.8 8.5 - 10.5 mg/dL    BUN 34 (H) 8 - 28 mg/dL    Creatinine 1.12 (H) 0.60 - 1.10 mg/dL    GFR MDRD Af Amer 56 (L) >60 mL/min/1.73m2    GFR MDRD Non Af Amer 46 (L) >60  mL/min/1.73m2     Vitamin D, Total (25-Hydroxy)   Date Value Ref Range Status   01/25/2017 31.5 30.0 - 80.0 ng/mL Final     Lab Results   Component Value Date    [id number] 722 01/25/2017     ASSESSMENT/PLAN:    1. Anemia - Improving, will continue Ferrous sulfate daily   2. Genital herpes - Valtrex    3. Essential hypertension with goal blood pressure less than 130/85 - Blood pressure are within target range. Norvasc has been discontinued in the hospital due to low blood pressures.  Also had electrolyte imbalance in the hospital which has resolved.     4. Cognitive impairment - Global amnesia per neurology.     5. Depression - Mood is stable on Celexa 20 mg daily.  We will observe for now.     6. Hyperlipidemia - Stable on Zocor             Electronically signed by:  Magen Ashton CNP

## 2021-06-15 PROBLEM — F32.1 MAJOR DEPRESSIVE DISORDER, SINGLE EPISODE, MODERATE (H): Status: ACTIVE | Noted: 2017-03-11

## 2021-06-15 PROBLEM — N31.9 NEUROGENIC BLADDER: Status: ACTIVE | Noted: 2017-03-28

## 2021-06-15 PROBLEM — R41.3 GLOBAL AMNESIA: Status: ACTIVE | Noted: 2017-03-27

## 2021-06-15 PROBLEM — R41.89 COGNITIVE IMPAIRMENT: Status: ACTIVE | Noted: 2017-03-10

## 2021-06-15 PROBLEM — E87.1 HYPONATREMIA: Status: ACTIVE | Noted: 2017-03-21

## 2021-06-15 PROBLEM — I10 ESSENTIAL HYPERTENSION WITH GOAL BLOOD PRESSURE LESS THAN 130/85: Status: ACTIVE | Noted: 2017-03-10

## 2021-06-15 PROBLEM — J96.01 ACUTE RESPIRATORY FAILURE WITH HYPOXIA (H): Status: ACTIVE | Noted: 2017-02-28

## 2021-06-15 PROBLEM — D50.9 IRON DEFICIENCY ANEMIA, UNSPECIFIED IRON DEFICIENCY ANEMIA TYPE: Status: ACTIVE | Noted: 2017-03-11

## 2021-06-15 PROBLEM — J18.9 PNEUMONIA OF BOTH LUNGS DUE TO INFECTIOUS ORGANISM, UNSPECIFIED PART OF LUNG: Status: ACTIVE | Noted: 2017-02-28

## 2021-06-15 PROBLEM — E87.6 HYPOKALEMIA: Status: ACTIVE | Noted: 2017-03-11

## 2021-06-15 PROBLEM — R53.1 WEAKNESS GENERALIZED: Status: ACTIVE | Noted: 2017-03-10

## 2021-08-21 ENCOUNTER — HEALTH MAINTENANCE LETTER (OUTPATIENT)
Age: 86
End: 2021-08-21

## 2021-10-11 ENCOUNTER — HEALTH MAINTENANCE LETTER (OUTPATIENT)
Age: 86
End: 2021-10-11

## 2022-09-25 ENCOUNTER — HEALTH MAINTENANCE LETTER (OUTPATIENT)
Age: 87
End: 2022-09-25

## 2023-10-14 ENCOUNTER — HEALTH MAINTENANCE LETTER (OUTPATIENT)
Age: 88
End: 2023-10-14